# Patient Record
Sex: FEMALE | Race: WHITE | Employment: FULL TIME | URBAN - METROPOLITAN AREA
[De-identification: names, ages, dates, MRNs, and addresses within clinical notes are randomized per-mention and may not be internally consistent; named-entity substitution may affect disease eponyms.]

---

## 2018-09-10 ENCOUNTER — HOSPITAL ENCOUNTER (OUTPATIENT)
Age: 58
Setting detail: SPECIMEN
Discharge: HOME | End: 2018-09-10
Payer: COMMERCIAL

## 2018-09-10 ENCOUNTER — OFFICE VISIT (OUTPATIENT)
Dept: OBGYN CLINIC | Age: 58
End: 2018-09-10
Payer: COMMERCIAL

## 2018-09-10 VITALS
HEART RATE: 76 BPM | WEIGHT: 144.9 LBS | SYSTOLIC BLOOD PRESSURE: 108 MMHG | HEIGHT: 61 IN | BODY MASS INDEX: 27.36 KG/M2 | DIASTOLIC BLOOD PRESSURE: 68 MMHG

## 2018-09-10 DIAGNOSIS — Z01.419 ENCOUNTER FOR ANNUAL ROUTINE GYNECOLOGICAL EXAMINATION: Primary | ICD-10-CM

## 2018-09-10 DIAGNOSIS — N95.1 VAGINAL DRYNESS, MENOPAUSAL: ICD-10-CM

## 2018-09-10 PROCEDURE — 99386 PREV VISIT NEW AGE 40-64: CPT | Performed by: ADVANCED PRACTICE MIDWIFE

## 2018-09-10 RX ORDER — ESTRADIOL 10 UG/1
10 INSERT VAGINAL DAILY
Qty: 30 TABLET | Refills: 1 | Status: SHIPPED | OUTPATIENT
Start: 2018-09-10 | End: 2018-12-05 | Stop reason: SDUPTHER

## 2018-09-10 RX ORDER — LEVOTHYROXINE SODIUM 0.1 MG/1
100 TABLET ORAL DAILY
COMMUNITY

## 2018-09-10 ASSESSMENT — PATIENT HEALTH QUESTIONNAIRE - PHQ9
2. FEELING DOWN, DEPRESSED OR HOPELESS: 0
1. LITTLE INTEREST OR PLEASURE IN DOING THINGS: 0
SUM OF ALL RESPONSES TO PHQ QUESTIONS 1-9: 0
SUM OF ALL RESPONSES TO PHQ9 QUESTIONS 1 & 2: 0
SUM OF ALL RESPONSES TO PHQ QUESTIONS 1-9: 0

## 2018-09-10 ASSESSMENT — ENCOUNTER SYMPTOMS
RESPIRATORY NEGATIVE: 1
EYES NEGATIVE: 1
GASTROINTESTINAL NEGATIVE: 1

## 2018-09-10 NOTE — PROGRESS NOTES
Subjective:     CHIEF COMPLAINT:     Chief Complaint   Patient presents with    Gynecologic Exam     doesn't remember when last pap was       Here ofr Annual and admits has been at least 10 years or more since last exam  Previously saw Dustin Knox  Doing well overall but her main complaint today is vaginal dryness, affecting intimacy. Relates experienced menopause at least 10 years ago. Used BHT and nothing in last several years. Occasional hot flash only  No issues with bowels, bladder previously with incontinence (had sling) and using detrol thru her PCP  Just had mammogram done today, up to date on her colonscopy        Depression  2 question Screen:  Over the past two weeks, has the patient felt down, depressed or hopeless? No  Over the past two weeks, has the patient felt little interest or pleasure in doing things? No    PREVENTIVE HEALTH SCREENING:   Date of last pap: ?               HPV typing/date :?  Abnormal pap smear history: no    Date of last mammogram: Today   Date of last DEXA scan: NA  Date of last colonoscopy: 5 years ago    Preventive screening: Yes    Family history of Breast, Ovarian, Colon or Uterine Cancer: Adopted     If Yes see scanned worksheet    Objective:   GYNECOLOGIC HISTORY:       LMP:  No LMP recorded (lmp unknown).  Patient is postmenopausal.    Menopause: Yes    Sexually active: Yes    STD history: No    Hormone Replacement: no    SOCIAL HISTORY:  Seat Belt Use: Yes  Domestic Violence: No    Counseling: No  Regular exercise: No   Counseling: Yes     Diet discussed: No    History   Smoking Status    Never Smoker   Smokeless Tobacco    Never Used     History   Alcohol Use    Yes     History   Drug Use No       Current Outpatient Prescriptions   Medication Sig Dispense Refill    Tolterodine Tartrate (DETROL PO) Take by mouth      levothyroxine (SYNTHROID) 100 MCG tablet Take 100 mcg by mouth Daily      Estradiol (VAGIFEM) 10 MCG TABS vaginal tablet Place 1 tablet vaginally daily Once daily vaginally for 2 weeks then twice weekly 30 tablet 1     No current facility-administered medications for this visit. REVIEW OF SYSTEMS:  Review of Systems   Constitutional: Negative. HENT: Negative. Eyes: Negative. Respiratory: Negative. Cardiovascular: Negative. Gastrointestinal: Negative. Genitourinary:        Vaginal dryness   Musculoskeletal: Negative. Skin: Negative. Neurological: Negative. Endo/Heme/Allergies: Negative. Psychiatric/Behavioral: Negative. Physical Exam:     Constitutional:   Blood pressure 108/68, pulse 76, height 5' 1\" (1.549 m), weight 144 lb 14.4 oz (65.7 kg), not currently breastfeeding. Wt Readings from Last 3 Encounters:   09/10/18 144 lb 14.4 oz (65.7 kg)       General Appearance: This is a well-developed, well-nourished and well-groomed female    Skin:  Inspection of the skin revealed no rashes or lesions    Neck and EENT:  The neck was supple with full range of motion and no masses. The thyroid was not enlarged and had no masses. Normal external ears are present  Nares are patent    Respiratory: The lungs were clear to auscultation bilaterally. There were no rales, rhonchi or wheezes. There was good respiratory effort. Cardiovascular: The heart was in a regular rhythm and rate was normal.  No murmur or extra sounds were noted. Breast:  The breasts are normal size and symmetrical.  There are no skin changes with position changes. The nipples are without deviations or discharge, scar on left nipple area from previous biopsy. No masses were palpated. There is more fibrocystic change in the left breast compared to the right breastNo axillary lymphadenopathy is present. Back:  Straight with no CVA tenderness present    Abdomen: The abdomen is soft and non-tender. There was no guarding, rebound or rigidity. The bladder was without fullness or tenderness. No hernias were appreciated. Pelvic:   The external genitalia has a normal appearance without masses or lesions. BUS is normal. The labia are thinned, pale pink and fused. The vagina is pale pink with no rugae. The cervix is without lesions with no CMT. Pap was obtained without difficulty. The uterus is normal size, shape and consistency and retroverted. The adnexa are without masses or tenderness. Rectum is normal.    Psychiatric:  Alert, oriented to time, place, person and situation. There are no mood or affect changes. ASSESSMENT/PLAN:     Routine annual gynecological exam  · Return for Annual and pap per current recommendations  · Mammogram:  Done today   SBE reinforced  · DEXA scan:   NA  · Colonoscopy/FIT:  Done  · Routine health maintenance: Thru PCP  · Hereditary Breast, Ovarian, Colon and Uterine Cancer screening: Not done    Vaginal dryness (GSM)  · Discussed use of HT, local vs systemic. Risks/benefits reviewed. Opted for local use. Advised to monitor bladder as may help with incontinence also  · Will trial Vagifem for 3 months and return for follow up    Risk for Osteoporosis  · Discussed at high risk for Osteoporosis given various risk factors  · Is supplementing with Ca/Vit D and reinforced weightbearing exercises; will consider DEXA    Patient was seen with total face to face time of 30  minutes. More than 50% of this visit was counseling and education regarding    Diagnosis Orders   1. Encounter for annual routine gynecological examination  PAP Smear   2.  Vaginal dryness, menopausal  Estradiol (VAGIFEM) 10 MCG TABS vaginal tablet

## 2018-09-11 LAB
HPV SAMPLE: NORMAL
HPV SOURCE: NORMAL
HPV, GENOTYPE 16: NOT DETECTED
HPV, GENOTYPE 18: NOT DETECTED
HPV, HIGH RISK OTHER: NOT DETECTED
HPV, INTERPRETATION: NORMAL

## 2018-09-21 ENCOUNTER — TELEPHONE (OUTPATIENT)
Dept: OBGYN CLINIC | Age: 58
End: 2018-09-21

## 2018-09-21 LAB — CYTOLOGY REPORT: NORMAL

## 2018-09-21 NOTE — TELEPHONE ENCOUNTER
Spoke with Pee Weber. Has been experiencing cramping with onset of vaginal estrogen. Denies any bleeding or spotting but does say has noticed a yellow discharge, which she has not had in a \"long time. \"  At this time, she will finish daily use for the remainder of the 2 weeks, then switch to maintenance twice weekly. She is call back with any bleeding or spotting and US will be ordered.   She was given results of pap and HPV (normal/negative)

## 2018-12-05 ENCOUNTER — OFFICE VISIT (OUTPATIENT)
Dept: OBGYN CLINIC | Age: 58
End: 2018-12-05
Payer: COMMERCIAL

## 2018-12-05 VITALS
HEART RATE: 78 BPM | BODY MASS INDEX: 27.47 KG/M2 | HEIGHT: 61 IN | WEIGHT: 145.5 LBS | SYSTOLIC BLOOD PRESSURE: 108 MMHG | DIASTOLIC BLOOD PRESSURE: 74 MMHG

## 2018-12-05 DIAGNOSIS — N95.1 VAGINAL DRYNESS, MENOPAUSAL: ICD-10-CM

## 2018-12-05 PROCEDURE — G8484 FLU IMMUNIZE NO ADMIN: HCPCS | Performed by: ADVANCED PRACTICE MIDWIFE

## 2018-12-05 PROCEDURE — 1036F TOBACCO NON-USER: CPT | Performed by: ADVANCED PRACTICE MIDWIFE

## 2018-12-05 PROCEDURE — G8419 CALC BMI OUT NRM PARAM NOF/U: HCPCS | Performed by: ADVANCED PRACTICE MIDWIFE

## 2018-12-05 PROCEDURE — G8427 DOCREV CUR MEDS BY ELIG CLIN: HCPCS | Performed by: ADVANCED PRACTICE MIDWIFE

## 2018-12-05 PROCEDURE — 99213 OFFICE O/P EST LOW 20 MIN: CPT | Performed by: ADVANCED PRACTICE MIDWIFE

## 2018-12-05 PROCEDURE — 3017F COLORECTAL CA SCREEN DOC REV: CPT | Performed by: ADVANCED PRACTICE MIDWIFE

## 2018-12-05 RX ORDER — ESTRADIOL 10 UG/1
10 INSERT VAGINAL
Qty: 8 TABLET | Refills: 10 | Status: SHIPPED | OUTPATIENT
Start: 2018-12-06 | End: 2019-02-13

## 2019-02-13 DIAGNOSIS — N95.1 VAGINAL DRYNESS, MENOPAUSAL: ICD-10-CM

## 2019-02-13 RX ORDER — ESTRADIOL 10 UG/1
INSERT VAGINAL
Qty: 18 TABLET | Refills: 0 | Status: SHIPPED | OUTPATIENT
Start: 2019-02-13 | End: 2019-03-22 | Stop reason: SDUPTHER

## 2019-04-30 DIAGNOSIS — N95.1 VAGINAL DRYNESS, MENOPAUSAL: ICD-10-CM

## 2019-04-30 RX ORDER — ESTRADIOL 10 UG/1
10 INSERT VAGINAL DAILY
Qty: 18 TABLET | Refills: 2 | Status: SHIPPED | OUTPATIENT
Start: 2019-04-30 | End: 2021-01-22 | Stop reason: SDUPTHER

## 2019-10-30 ENCOUNTER — OFFICE VISIT (OUTPATIENT)
Dept: OBGYN CLINIC | Age: 59
End: 2019-10-30
Payer: COMMERCIAL

## 2019-10-30 VITALS
HEART RATE: 69 BPM | BODY MASS INDEX: 28.72 KG/M2 | SYSTOLIC BLOOD PRESSURE: 109 MMHG | DIASTOLIC BLOOD PRESSURE: 75 MMHG | HEIGHT: 61 IN | WEIGHT: 152.1 LBS

## 2019-10-30 DIAGNOSIS — N95.1 VAGINAL DRYNESS, MENOPAUSAL: ICD-10-CM

## 2019-10-30 DIAGNOSIS — Z12.31 ENCOUNTER FOR SCREENING MAMMOGRAM FOR MALIGNANT NEOPLASM OF BREAST: ICD-10-CM

## 2019-10-30 DIAGNOSIS — Z01.419 WOMEN'S ANNUAL ROUTINE GYNECOLOGICAL EXAMINATION: Primary | ICD-10-CM

## 2019-10-30 PROCEDURE — 99396 PREV VISIT EST AGE 40-64: CPT | Performed by: ADVANCED PRACTICE MIDWIFE

## 2019-10-30 RX ORDER — ESTRADIOL 10 UG/1
INSERT VAGINAL
Qty: 8 TABLET | Refills: 11 | Status: SHIPPED | OUTPATIENT
Start: 2019-10-30 | End: 2020-12-09

## 2019-10-30 ASSESSMENT — PATIENT HEALTH QUESTIONNAIRE - PHQ9
SUM OF ALL RESPONSES TO PHQ9 QUESTIONS 1 & 2: 0
1. LITTLE INTEREST OR PLEASURE IN DOING THINGS: 0
SUM OF ALL RESPONSES TO PHQ QUESTIONS 1-9: 0
2. FEELING DOWN, DEPRESSED OR HOPELESS: 0
SUM OF ALL RESPONSES TO PHQ QUESTIONS 1-9: 0

## 2019-10-30 ASSESSMENT — ENCOUNTER SYMPTOMS
CONSTIPATION: 1
RESPIRATORY NEGATIVE: 1

## 2021-01-22 ENCOUNTER — OFFICE VISIT (OUTPATIENT)
Dept: OBGYN CLINIC | Age: 61
End: 2021-01-22
Payer: COMMERCIAL

## 2021-01-22 VITALS
HEART RATE: 66 BPM | BODY MASS INDEX: 30.49 KG/M2 | HEIGHT: 61 IN | DIASTOLIC BLOOD PRESSURE: 62 MMHG | WEIGHT: 161.5 LBS | SYSTOLIC BLOOD PRESSURE: 111 MMHG

## 2021-01-22 DIAGNOSIS — Z12.31 ENCOUNTER FOR SCREENING MAMMOGRAM FOR BREAST CANCER: ICD-10-CM

## 2021-01-22 DIAGNOSIS — Z78.0 POST-MENOPAUSE: ICD-10-CM

## 2021-01-22 DIAGNOSIS — Z01.419 WOMEN'S ANNUAL ROUTINE GYNECOLOGICAL EXAMINATION: Primary | ICD-10-CM

## 2021-01-22 PROCEDURE — 99396 PREV VISIT EST AGE 40-64: CPT | Performed by: ADVANCED PRACTICE MIDWIFE

## 2021-01-22 RX ORDER — ESTRADIOL 10 UG/1
10 INSERT VAGINAL DAILY
Qty: 22 TABLET | Refills: 3 | Status: SHIPPED | OUTPATIENT
Start: 2021-01-22 | End: 2021-02-05

## 2021-01-22 SDOH — ECONOMIC STABILITY: FOOD INSECURITY: WITHIN THE PAST 12 MONTHS, YOU WORRIED THAT YOUR FOOD WOULD RUN OUT BEFORE YOU GOT MONEY TO BUY MORE.: NEVER TRUE

## 2021-01-22 SDOH — ECONOMIC STABILITY: INCOME INSECURITY: HOW HARD IS IT FOR YOU TO PAY FOR THE VERY BASICS LIKE FOOD, HOUSING, MEDICAL CARE, AND HEATING?: NOT HARD AT ALL

## 2021-01-22 SDOH — ECONOMIC STABILITY: TRANSPORTATION INSECURITY
IN THE PAST 12 MONTHS, HAS LACK OF TRANSPORTATION KEPT YOU FROM MEETINGS, WORK, OR FROM GETTING THINGS NEEDED FOR DAILY LIVING?: NO

## 2021-01-22 SDOH — ECONOMIC STABILITY: TRANSPORTATION INSECURITY
IN THE PAST 12 MONTHS, HAS THE LACK OF TRANSPORTATION KEPT YOU FROM MEDICAL APPOINTMENTS OR FROM GETTING MEDICATIONS?: NO

## 2021-01-22 ASSESSMENT — ENCOUNTER SYMPTOMS
GASTROINTESTINAL NEGATIVE: 1
RESPIRATORY NEGATIVE: 1

## 2021-01-22 NOTE — PROGRESS NOTES
Date of Visit: 1/22/2021    SUBJECTIVE:  Chief Complaint   Patient presents with    Gynecologic Exam     last pap 9/10/18 wnl hpv negative, vasiliy 3/11/20       HPI  Benny Mackenzie arrives for annual Well Woman exam.  Overall, doing well but has just lost mother to Covid and in grieving  Has some issues with vaginal dryness and wonders at increasing dose to 3x/week    Her No LMP recorded (lmp unknown). Patient is postmenopausal.. Her bowel habits are regular. She denies any bloating. She denies dysuria. She denies urinary leaking. She denies vaginal discharge. She is sexually active with       Review of Systems   Constitutional: Negative. HENT: Negative. Respiratory: Negative. Cardiovascular: Negative. Gastrointestinal: Negative. Genitourinary:        Vaginal dryness   Musculoskeletal: Negative. Skin: Negative. Neurological: Negative. Psychiatric/Behavioral: Positive for dysphoric mood. Grieving loss of mother         PREVENTIVE HEALTH SCREENING:   Date of last pap:  2018 normal              HPV typing/date: 2018  negative    Date of last mammogram: approximate date 3/20 and was normal  Date of last DEXA scan: NA  Date of last colonoscopy:     History of Gestational Diabetes: No  History of Pre-Eclampsia: No    Preventive screening through PCP: Yes    Family history of Breast, Ovarian, Colon or Uterine Cancer:  No     See updated review in Media     Genetic counseling:  Screened      GYNECOLOGIC HISTORY:  Menopause: Yes  HT use: Vaginal    STD history: No     Physical Exam:     Constitutional:   Blood pressure 111/62, pulse 66, height 5' 1\" (1.549 m), weight 161 lb 8 oz (73.3 kg), not currently breastfeeding. Wt Readings from Last 3 Encounters:   01/22/21 161 lb 8 oz (73.3 kg)   10/30/19 152 lb 1.6 oz (69 kg)   12/05/18 145 lb 8 oz (66 kg)       General Appearance: This is a well-developed, well-nourished and well-groomed female.  Alert and not in distress  Skin:  Inspection of the skin revealed no rashes or lesions  Neck and EENT:  No eye discharge and sclera non-icteric  Lips, teeth and gums without lesions and normal dentition  Nares are patent without discharge  Normal external ears are present with no hearing loss  The neck was supple with full range of motion and no masses. The thyroid was not enlarged and had no masses. No enlarged cervical lymph nodes  Lymphatic:   No lymph nodes were palpable in neck, axilla or groin   Neurologic:    Normal speech, no focal findings or movement disorder noted  Respiratory: The lungs were clear to auscultation bilaterally. There were no rales, rhonchi or wheezes. There was good respiratory effort. Cardiovascular: The heart was in a regular rhythm and rate was normal.  No murmur or extra sounds were noted. Breast:  The breasts are normal size and symmetrical.  There are no skin changes with position changes. The nipples are without deviations or discharge. No masses were palpated. No axillary or supraclavicular lymphadenopathy is present. Back:  Straight with no CVA tenderness present  Abdomen: The abdomen is soft and non-tender. There was no guarding, rebound or rigidity. The bladder was without fullness or tenderness. No hernias were appreciated. Pelvic:  Deferred  Musculoskeletal:   Normal gait. No contractions with normal movement of all extremities. Range of motion appropriate for patient's age. Extremities:  No cyanosis or edema present. No calf tenderness  Neurologic:    Normal speech, no focal findings or movement disorder noted  Psychiatric:  Alert, oriented to time, place, person and situation. There are no mood or affect changes. ASSESSMENT/PLAN:  1.  Women's annual routine gynecological examination      AGE>40 Counseling and Evaluation  Sexuality/Reproductive Planning  [] Discussed birth control as needed and reviewed ACHES; refills given   [] Reproductive plan discussed (as appropriate)  [x] Sexual function: Discussed  [] Discussed STI counseling/prevention  Fitness/Nutrition  [x] Physical activity  [] Folic Acid supplementation discussed  [] Calcium (split dose) supplementation  Health/Risk Assessment  [x] Discussed annual Well-Woman exams every year; Pap per ASCCP guidelines and testing  [x] Discussed mammogram screening (ages 39/51) per current recommendations (if no abnormalities or family history; breast self-awareness reinforced  [] Discussed colonoscopy screening (age 48)  [x] Discussed DEXA screening at age 72 )if no fracture history or Osteoporosis family history: Baseline secondary to thyroid meds  [x] Reinforced Calcium (split dose)/Vitamin D supplementation  [x] Hereditary Breast/Ovarian Cancer screening: Updated  [x] Hepatitis C screening: Discussed  [] Immunizations:  [] Personal history of Pre-Eclampsia or GDM  [x] Tobacco & Secondary smoke risks: Not a smoker  [x] Health maintenance through PCP  Psychosocial Evaluation  [x] Intimate partner violence screening  [x] Depression/Anxiety screening  [] Lifestyle/stress: Mother death  Cardiovascular Risk Factors  [] Family history  [] Hypertension  [] Dyslipidemia  [] Obesity  [] Diabetes Mellitus  [] Personal hx of PreE, GDM, or PIH  [] Lifestyle    2. Post-menopause  - Discussed restart of vaginal estrogen and is agreeable. Advised standard maintenance is twice weekly. Will evaluate  - DEXA AXIAL SKELETON W VERTEBRAL FX ASST; Future    3. Encounter for screening mammogram for breast cancer  - LILO DIGITAL SCREEN W OR WO CAD BILATERAL; Future    The patient, Justine Dancer,  was seen for the visit on this date of service by the [facility]  The time component, involved both face-to-face (counseling and education)  and non face-to-face time (care coordination), spent in determining the total time component. She was also counseled on her preventative health maintenance recommendations and follow-up.     Electronically Signed by Liset Estrada, APRN - CNM

## 2021-03-24 ENCOUNTER — HOSPITAL ENCOUNTER (OUTPATIENT)
Dept: MAMMOGRAPHY | Age: 61
Discharge: HOME | End: 2021-03-26
Payer: COMMERCIAL

## 2021-03-24 DIAGNOSIS — Z12.31 ENCOUNTER FOR SCREENING MAMMOGRAM FOR BREAST CANCER: ICD-10-CM

## 2021-03-24 DIAGNOSIS — Z78.0 POST-MENOPAUSE: ICD-10-CM

## 2021-03-24 DIAGNOSIS — Z13.820 SCREENING FOR OSTEOPOROSIS: ICD-10-CM

## 2021-03-24 PROCEDURE — 77063 BREAST TOMOSYNTHESIS BI: CPT

## 2021-03-24 PROCEDURE — 77080 DXA BONE DENSITY AXIAL: CPT
